# Patient Record
Sex: FEMALE | Race: ASIAN | Employment: FULL TIME | URBAN - METROPOLITAN AREA
[De-identification: names, ages, dates, MRNs, and addresses within clinical notes are randomized per-mention and may not be internally consistent; named-entity substitution may affect disease eponyms.]

---

## 2017-02-13 LAB
C TRACH DNA SPEC QL PROBE+SIG AMP: NEGATIVE
N GONORRHOEA DNA SPEC QL PROBE+SIG AMP: NEGATIVE

## 2019-02-19 LAB
HBV SURFACE AG SERPL QL IA: POSITIVE
HIV 1+2 AB+HIV1 P24 AG SERPL QL IA: NORMAL
RUBELLA ABY IGG: NORMAL
TREPONEMA ANTIBODIES: NORMAL

## 2019-08-29 LAB — GROUP B STREP PCR: NEGATIVE

## 2019-09-22 ENCOUNTER — HOSPITAL ENCOUNTER (OUTPATIENT)
Facility: CLINIC | Age: 29
End: 2019-09-22
Attending: OBSTETRICS & GYNECOLOGY | Admitting: OBSTETRICS & GYNECOLOGY

## 2019-09-22 ENCOUNTER — HOSPITAL ENCOUNTER (INPATIENT)
Facility: CLINIC | Age: 29
LOS: 2 days | Discharge: HOME HEALTH | End: 2019-09-24
Attending: OBSTETRICS & GYNECOLOGY | Admitting: OBSTETRICS & GYNECOLOGY
Payer: COMMERCIAL

## 2019-09-22 LAB
ABO + RH BLD: NORMAL
ABO + RH BLD: NORMAL
BLD GP AB SCN SERPL QL: NORMAL
BLOOD BANK CMNT PATIENT-IMP: NORMAL
HGB BLD-MCNC: 13.4 G/DL (ref 11.7–15.7)
RUPTURE OF FETAL MEMBRANES BY ROM PLUS: POSITIVE
SPECIMEN EXP DATE BLD: NORMAL
T PALLIDUM AB SER QL: NONREACTIVE

## 2019-09-22 PROCEDURE — 86780 TREPONEMA PALLIDUM: CPT | Performed by: OBSTETRICS & GYNECOLOGY

## 2019-09-22 PROCEDURE — 85018 HEMOGLOBIN: CPT | Performed by: OBSTETRICS & GYNECOLOGY

## 2019-09-22 PROCEDURE — 25000132 ZZH RX MED GY IP 250 OP 250 PS 637: Performed by: OBSTETRICS & GYNECOLOGY

## 2019-09-22 PROCEDURE — 25800030 ZZH RX IP 258 OP 636: Performed by: OBSTETRICS & GYNECOLOGY

## 2019-09-22 PROCEDURE — 25000128 H RX IP 250 OP 636: Performed by: OBSTETRICS & GYNECOLOGY

## 2019-09-22 PROCEDURE — 86900 BLOOD TYPING SEROLOGIC ABO: CPT | Performed by: OBSTETRICS & GYNECOLOGY

## 2019-09-22 PROCEDURE — 84112 EVAL AMNIOTIC FLUID PROTEIN: CPT | Performed by: OBSTETRICS & GYNECOLOGY

## 2019-09-22 PROCEDURE — G0463 HOSPITAL OUTPT CLINIC VISIT: HCPCS

## 2019-09-22 PROCEDURE — 12000000 ZZH R&B MED SURG/OB

## 2019-09-22 PROCEDURE — 86850 RBC ANTIBODY SCREEN: CPT | Performed by: OBSTETRICS & GYNECOLOGY

## 2019-09-22 PROCEDURE — 37000011 ZZH ANESTHESIA WARD SERVICE

## 2019-09-22 PROCEDURE — 86901 BLOOD TYPING SEROLOGIC RH(D): CPT | Performed by: OBSTETRICS & GYNECOLOGY

## 2019-09-22 PROCEDURE — 25000125 ZZHC RX 250: Performed by: OBSTETRICS & GYNECOLOGY

## 2019-09-22 PROCEDURE — 72200001 ZZH LABOR CARE VAGINAL DELIVERY SINGLE

## 2019-09-22 PROCEDURE — 0KQM0ZZ REPAIR PERINEUM MUSCLE, OPEN APPROACH: ICD-10-PCS | Performed by: OBSTETRICS & GYNECOLOGY

## 2019-09-22 RX ORDER — AMOXICILLIN 250 MG
2 CAPSULE ORAL 2 TIMES DAILY
Status: DISCONTINUED | OUTPATIENT
Start: 2019-09-22 | End: 2019-09-24 | Stop reason: HOSPADM

## 2019-09-22 RX ORDER — ACETAMINOPHEN 325 MG/1
650 TABLET ORAL EVERY 4 HOURS PRN
Status: DISCONTINUED | OUTPATIENT
Start: 2019-09-22 | End: 2019-09-24 | Stop reason: HOSPADM

## 2019-09-22 RX ORDER — OXYTOCIN/0.9 % SODIUM CHLORIDE 30/500 ML
100 PLASTIC BAG, INJECTION (ML) INTRAVENOUS CONTINUOUS
Status: DISCONTINUED | OUTPATIENT
Start: 2019-09-22 | End: 2019-09-24 | Stop reason: HOSPADM

## 2019-09-22 RX ORDER — OXYTOCIN 10 [USP'U]/ML
10 INJECTION, SOLUTION INTRAMUSCULAR; INTRAVENOUS
Status: DISCONTINUED | OUTPATIENT
Start: 2019-09-22 | End: 2019-09-23 | Stop reason: CLARIF

## 2019-09-22 RX ORDER — OXYCODONE AND ACETAMINOPHEN 5; 325 MG/1; MG/1
1 TABLET ORAL
Status: DISCONTINUED | OUTPATIENT
Start: 2019-09-22 | End: 2019-09-23 | Stop reason: CLARIF

## 2019-09-22 RX ORDER — PRENATAL VIT/IRON FUM/FOLIC AC 27MG-0.8MG
1 TABLET ORAL DAILY
COMMUNITY

## 2019-09-22 RX ORDER — IBUPROFEN 800 MG/1
800 TABLET, FILM COATED ORAL EVERY 6 HOURS PRN
Status: DISCONTINUED | OUTPATIENT
Start: 2019-09-22 | End: 2019-09-24 | Stop reason: HOSPADM

## 2019-09-22 RX ORDER — IBUPROFEN 800 MG/1
800 TABLET, FILM COATED ORAL
Status: COMPLETED | OUTPATIENT
Start: 2019-09-22 | End: 2019-09-22

## 2019-09-22 RX ORDER — BUPIVACAINE HCL/0.9 % NACL/PF 0.125 %
PLASTIC BAG, INJECTION (ML) EPIDURAL CONTINUOUS
Status: DISCONTINUED | OUTPATIENT
Start: 2019-09-22 | End: 2019-09-23 | Stop reason: CLARIF

## 2019-09-22 RX ORDER — BISACODYL 10 MG
10 SUPPOSITORY, RECTAL RECTAL DAILY PRN
Status: DISCONTINUED | OUTPATIENT
Start: 2019-09-24 | End: 2019-09-24 | Stop reason: HOSPADM

## 2019-09-22 RX ORDER — FENTANYL CITRATE 50 UG/ML
50-100 INJECTION, SOLUTION INTRAMUSCULAR; INTRAVENOUS
Status: DISCONTINUED | OUTPATIENT
Start: 2019-09-22 | End: 2019-09-23 | Stop reason: CLARIF

## 2019-09-22 RX ORDER — SODIUM CHLORIDE, SODIUM LACTATE, POTASSIUM CHLORIDE, CALCIUM CHLORIDE 600; 310; 30; 20 MG/100ML; MG/100ML; MG/100ML; MG/100ML
INJECTION, SOLUTION INTRAVENOUS CONTINUOUS
Status: DISCONTINUED | OUTPATIENT
Start: 2019-09-22 | End: 2019-09-23 | Stop reason: CLARIF

## 2019-09-22 RX ORDER — METHYLERGONOVINE MALEATE 0.2 MG/ML
200 INJECTION INTRAVENOUS
Status: DISCONTINUED | OUTPATIENT
Start: 2019-09-22 | End: 2019-09-23 | Stop reason: CLARIF

## 2019-09-22 RX ORDER — CARBOPROST TROMETHAMINE 250 UG/ML
250 INJECTION, SOLUTION INTRAMUSCULAR
Status: DISCONTINUED | OUTPATIENT
Start: 2019-09-22 | End: 2019-09-23 | Stop reason: CLARIF

## 2019-09-22 RX ORDER — OXYTOCIN/0.9 % SODIUM CHLORIDE 30/500 ML
340 PLASTIC BAG, INJECTION (ML) INTRAVENOUS CONTINUOUS PRN
Status: DISCONTINUED | OUTPATIENT
Start: 2019-09-22 | End: 2019-09-24 | Stop reason: HOSPADM

## 2019-09-22 RX ORDER — NALBUPHINE HYDROCHLORIDE 10 MG/ML
2.5-5 INJECTION, SOLUTION INTRAMUSCULAR; INTRAVENOUS; SUBCUTANEOUS EVERY 6 HOURS PRN
Status: DISCONTINUED | OUTPATIENT
Start: 2019-09-22 | End: 2019-09-23 | Stop reason: CLARIF

## 2019-09-22 RX ORDER — LANOLIN 100 %
OINTMENT (GRAM) TOPICAL
Status: DISCONTINUED | OUTPATIENT
Start: 2019-09-22 | End: 2019-09-24 | Stop reason: HOSPADM

## 2019-09-22 RX ORDER — OXYTOCIN/0.9 % SODIUM CHLORIDE 30/500 ML
100-340 PLASTIC BAG, INJECTION (ML) INTRAVENOUS CONTINUOUS PRN
Status: COMPLETED | OUTPATIENT
Start: 2019-09-22 | End: 2019-09-22

## 2019-09-22 RX ORDER — HYDROCORTISONE 2.5 %
CREAM (GRAM) TOPICAL 3 TIMES DAILY PRN
Status: DISCONTINUED | OUTPATIENT
Start: 2019-09-22 | End: 2019-09-24 | Stop reason: HOSPADM

## 2019-09-22 RX ORDER — OXYTOCIN 10 [USP'U]/ML
10 INJECTION, SOLUTION INTRAMUSCULAR; INTRAVENOUS
Status: DISCONTINUED | OUTPATIENT
Start: 2019-09-22 | End: 2019-09-24 | Stop reason: HOSPADM

## 2019-09-22 RX ORDER — NALOXONE HYDROCHLORIDE 0.4 MG/ML
.1-.4 INJECTION, SOLUTION INTRAMUSCULAR; INTRAVENOUS; SUBCUTANEOUS
Status: DISCONTINUED | OUTPATIENT
Start: 2019-09-22 | End: 2019-09-22

## 2019-09-22 RX ORDER — ONDANSETRON 2 MG/ML
4 INJECTION INTRAMUSCULAR; INTRAVENOUS EVERY 6 HOURS PRN
Status: DISCONTINUED | OUTPATIENT
Start: 2019-09-22 | End: 2019-09-23 | Stop reason: CLARIF

## 2019-09-22 RX ORDER — ACETAMINOPHEN 325 MG/1
650 TABLET ORAL EVERY 4 HOURS PRN
Status: DISCONTINUED | OUTPATIENT
Start: 2019-09-22 | End: 2019-09-23 | Stop reason: CLARIF

## 2019-09-22 RX ORDER — AMOXICILLIN 250 MG
1 CAPSULE ORAL 2 TIMES DAILY
Status: DISCONTINUED | OUTPATIENT
Start: 2019-09-22 | End: 2019-09-24 | Stop reason: HOSPADM

## 2019-09-22 RX ORDER — NALOXONE HYDROCHLORIDE 0.4 MG/ML
.1-.4 INJECTION, SOLUTION INTRAMUSCULAR; INTRAVENOUS; SUBCUTANEOUS
Status: DISCONTINUED | OUTPATIENT
Start: 2019-09-22 | End: 2019-09-24 | Stop reason: HOSPADM

## 2019-09-22 RX ORDER — EPHEDRINE SULFATE 50 MG/ML
5 INJECTION, SOLUTION INTRAMUSCULAR; INTRAVENOUS; SUBCUTANEOUS
Status: DISCONTINUED | OUTPATIENT
Start: 2019-09-22 | End: 2019-09-23 | Stop reason: CLARIF

## 2019-09-22 RX ADMIN — Medication 340 ML/HR: at 12:28

## 2019-09-22 RX ADMIN — SENNOSIDES AND DOCUSATE SODIUM 1 TABLET: 8.6; 5 TABLET ORAL at 21:04

## 2019-09-22 RX ADMIN — IBUPROFEN 800 MG: 800 TABLET ORAL at 19:19

## 2019-09-22 RX ADMIN — SODIUM CHLORIDE, POTASSIUM CHLORIDE, SODIUM LACTATE AND CALCIUM CHLORIDE: 600; 310; 30; 20 INJECTION, SOLUTION INTRAVENOUS at 06:53

## 2019-09-22 RX ADMIN — FENTANYL CITRATE 100 MCG: 50 INJECTION INTRAMUSCULAR; INTRAVENOUS at 07:05

## 2019-09-22 RX ADMIN — IBUPROFEN 800 MG: 800 TABLET ORAL at 12:55

## 2019-09-22 RX ADMIN — FENTANYL CITRATE 100 MCG: 50 INJECTION INTRAMUSCULAR; INTRAVENOUS at 09:31

## 2019-09-22 RX ADMIN — FENTANYL CITRATE 100 MCG: 50 INJECTION INTRAMUSCULAR; INTRAVENOUS at 05:55

## 2019-09-22 RX ADMIN — FENTANYL CITRATE 100 MCG: 50 INJECTION INTRAMUSCULAR; INTRAVENOUS at 08:15

## 2019-09-22 RX ADMIN — LIDOCAINE HYDROCHLORIDE 30 ML: 10 INJECTION, SOLUTION EPIDURAL; INFILTRATION; INTRACAUDAL; PERINEURAL at 12:40

## 2019-09-22 SDOH — HEALTH STABILITY: MENTAL HEALTH: HOW OFTEN DO YOU HAVE A DRINK CONTAINING ALCOHOL?: NEVER

## 2019-09-22 NOTE — PLAN OF CARE
Data: Patient presented to Birthplace: 2019  5:01 AM.  Reason for maternal/fetal assessment is uterine contractions and leaking vaginal fluid. Patient reports leaking of fluid at 0200 also the start of contractions.  Patient is a .  Prenatal record reviewed. Pregnancy  has been complicated by has been uncomplicated.  Gestational Age 39w3d. VSS. Fetal movement present. Patient denies nausea, vomiting, headache, visual disturbances, epigastric or URQ pain, significant edema. Support person is present.   Action: Verbal consent for EFM. Triage assessment completed. Bill of rights reviewed.  Response: Patient verbalized agreement with plan. Will contact Dr Denise Harris with update and further orders.

## 2019-09-22 NOTE — DOWNTIME EVENT NOTE
The EMR was down for 2.5 hours on 9/22/2019.    MARVIN Broussard was responsible for completing the paper charting during this time period.     The following information was re-entered into the system by Aarti Hill RN: Orders    The following information will remain in the paper chart: rom + results    Aarti Hill RN  9/22/2019

## 2019-09-22 NOTE — PROVIDER NOTIFICATION
09/22/19 1012   Provider Notification   Provider Name/Title Dr. Dumont   Method of Notification Phone   Request Evaluate - Remote   Notification Reason SVE;Status Update     RN performed SVE. Pt had anterior lip that was reduced with pushing. Dr. Dumont notified and states to push with pt to ensure AL is gone and she will be up to the floor ASAP to evaluate pt in person.

## 2019-09-22 NOTE — PROVIDER NOTIFICATION
09/22/19 0550   Provider Notification   Provider Name/Title Dr. Harris   Method of Notification Phone   Request Evaluate - Remote   Notification Reason Pain;SVE     OB notified patient is now 6.5/90/-1 and very uncomfortable. OB gives verbal order for one dose of fentanyl since patient is a G1

## 2019-09-22 NOTE — PLAN OF CARE
Data: Leonela Darling transferred to 425 via wheelchair at 1620. Baby transferred via parent's arms.  Action: Receiving unit notified of transfer: Yes. Patient and family notified of room change. Report given to Chalino at 1635. Belongings sent to receiving unit. Accompanied by Registered Nurse. Oriented patient to surroundings. Call light within reach. ID bands double-checked with receiving RN.  Response: Patient tolerated transfer and is stable.Pt up to void before transfer

## 2019-09-22 NOTE — PLAN OF CARE
"\"Patient in room 9, Leonela Darling, is now 7/90/0 and requesting another dose of fentanyl. She is a G1 at 39+3 weeks. Yaz every 2-4 minutes, category 1 tracing. Doesn't want an epidural at this time. -MARVIN Broussard [id number]\"    spok page sent to Dr. Dumont    Verbal order to give fentanyl by Dr. Dumont  "

## 2019-09-22 NOTE — PROVIDER NOTIFICATION
09/22/19 0920   Provider Notification   Provider Name/Title Dr. Dumont   Method of Notification Phone   Request Evaluate - Remote   Notification Reason Pain;SVE     RN performed SVE - Dr. Dumont notified regarding status update. Will continue to follow.

## 2019-09-22 NOTE — L&D DELIVERY NOTE
Leonela Darling is a 29 year old-year-old female,  1 para 0 with and EDC 19, who was admitted for SROM at 39 weeks gestation.    Her prenatal care was at the [facility] in [place]. Prenatal course was complicated by chronic hep B. Vaginal Group B Streptococcus culture was negative.  Her estimated fetal weight was 7 pounds 7ounces.  The patient achieved complete dilation at 1012. She went on to deliver a male infant at 1220 by . Apgars were  7 at one minute and 9 at five minutes. The fetal oropharynx was bulb suctioned on the perineum.  There was no nuchal cord. The placenta delivered spontaneously and intact at 1225.  The patient had a second degree that extended into a left labia. This was repaired with #3-0 vicryl.  EBL for the delivery was 153.  Dr. Jacqueline Dumont DO

## 2019-09-22 NOTE — H&P
[facility]     History and Physical  Obstetrics and Gynecology     Date of Admission:  2019    History of Present Illness   Leonela Darling is a 29 year old female  39w3d with Estimated Date of Delivery: Sep 26, 2019 who presents with ROM    She presents to the [facility] in ROM.    PRENATAL COURSE  Prenatal care was received at [facility] and the  course was complicated by chronic hep B.      Recent Labs   Lab Test 19  0515   ABO AB   RH Pos   AS Neg     Rhogam not indicated   Recent Labs   Lab Test 19   HEPBANG  --  Positive   HIAGAB  --  Non-reactive   GBS Negative  --        Past Medical History    Past medical history reviewed     Past Surgical History   Past surgical history reviewed      Prior to Admission Medications   Prior to Admission Medications   Prescriptions Last Dose Informant Patient Reported? Taking?   Prenatal Vit-Fe Fumarate-FA (PRENATAL MULTIVITAMIN W/IRON) 27-0.8 MG tablet 2019 at Unknown time  Yes Yes   Sig: Take 1 tablet by mouth daily      Facility-Administered Medications: None     Allergies   No Known Allergies    Social History   I have personally reviewed the social history     Family History   Family history reviewed with patient and is noncontributory.    Immunization History   Immunizations are up to date    Physical Exam   Temp: 98.1  F (36.7  C) Temp src: Oral BP: 131/75 Pulse: 77   Resp: 18        Vital Signs with Ranges  Temp:  [98.1  F (36.7  C)-98.8  F (37.1  C)] 98.1  F (36.7  C)  Pulse:  [77] 77  Resp:  [16-18] 18  BP: (131-143)/(75-86) 131/75  Fetal Heart Tones: 135 baseline, moderate variablility, + accels, no decels and Category I  TOCO: frequency q 2-6 minutes    Constitutional: healthy, alert and active   Respiratory: No increased work of breathing, good air exchange, clear to auscultation bilaterally, no crackles or wheezing  Cardiovascular: Normal apical impulse, regular rate and  rhythm, normal S1 and S2, no S3 or S4, and no murmur noted  Abdomen: gravid, single vertex fetus, non-tender, EFW 7 lbs 7  Cervical Exam:        Assessment & Plan   Leonela Darling is a 29 year old female  who presents at 39w3d with ROM and labor.   GBS begtive.   NST reactive.  Category  I.     Admit - see IP orders  Anticipate     Jacqueline Dumont DO,

## 2019-09-22 NOTE — PROVIDER NOTIFICATION
09/22/19 0400   Provider Notification   Provider Name/Title Dr. Harris   Method of Notification Phone   Request Evaluate - Remote   Notification Reason Patient Arrived;Membrane Status;SVE     OB notified of patient arrival. Patient is a G1 at 39 weeks, no prenatal information due to epic downtime, patient is a [employer] divert. SVE 3/90/-1, contractions q 1.5-3 minutes palpating moderate. ROM + positive. Patient to be admitted for labor.

## 2019-09-23 PROCEDURE — 12000000 ZZH R&B MED SURG/OB

## 2019-09-23 PROCEDURE — 25000132 ZZH RX MED GY IP 250 OP 250 PS 637: Performed by: OBSTETRICS & GYNECOLOGY

## 2019-09-23 RX ADMIN — SENNOSIDES AND DOCUSATE SODIUM 1 TABLET: 8.6; 5 TABLET ORAL at 21:18

## 2019-09-23 RX ADMIN — IBUPROFEN 800 MG: 800 TABLET ORAL at 05:12

## 2019-09-23 RX ADMIN — ACETAMINOPHEN 650 MG: 325 TABLET, FILM COATED ORAL at 10:00

## 2019-09-23 RX ADMIN — IBUPROFEN 800 MG: 800 TABLET ORAL at 21:18

## 2019-09-23 RX ADMIN — IBUPROFEN 800 MG: 800 TABLET ORAL at 15:38

## 2019-09-23 RX ADMIN — SENNOSIDES AND DOCUSATE SODIUM 1 TABLET: 8.6; 5 TABLET ORAL at 10:00

## 2019-09-23 NOTE — PLAN OF CARE
Vital signs stable on room air. Ambulating independently. Voiding spontaneously. Pain adequately controled with ibuprofen.  at bedside and supportive. Attempting to breastfeed, using a nipple shield as needed, baby is sleepy at the breast, hand expressing colostrum.

## 2019-09-23 NOTE — PLAN OF CARE
Patient meeting expected outcomes, VSS. Bonding well with infant (Eder). Supported in room by  (Terrell).  Fundus firm, midline ; Lochia rubra, scant.   Feeding: Breastfeeding infant with some assistance needed with positioning. Infant sleepy at breast, so parents have wanted to supplement with formula after breastfeeding attempts which is tolerated well. Education done with positioning and waking the infant a bit for feeds.  Mobility: Up ad franko, independent with cares for self and infant.  I&O: Regular diet, voiding.   Pain: denies.   IV: removed during shift.   Note: Hepatitis B positive.

## 2019-09-23 NOTE — PROGRESS NOTES
Patient comfotable. Tolerating regular diet. Urinating ok. Bleeding moderate/minimal. Pain under control with ibuprofen and tylenol.        /73   Pulse 78   Temp 99  F (37.2  C) (Oral)   Resp 20   Breastfeeding? Unknown   Gen: well female NAD  FF: umb   Ext: NT no cords    A/P  Post partum day #1  Doing well  Routine post partum cares  Anticipate discharge tomorrow    Nicole Cobb MD on 9/23/2019 at 1:06 PM

## 2019-09-23 NOTE — PROGRESS NOTES
Patient unavailable as requested no interruptions when Public Health Nurse (PHN) attempted to discuss [facility] resources.

## 2019-09-23 NOTE — PLAN OF CARE
Instructed to fill out birth certificate , depression scale and watch DVD.   Pt taking Ibuprofen for uterine cramping with relief.   Mom ad FOB are independent with baby cares.   Lots of visitors this evening.

## 2019-09-23 NOTE — LACTATION NOTE
This note was copied from a baby's chart.  LC visit and assist with latch.  Her baby has been supplementing with formula per parental preference due to concerns about milk supply.  LC noted good volumes of colostrum that are easily expressed, but disinterest in latching by infant due to transitioning behavior with spittiness.  LC does not recommend formula at this time due to infant emeses and frequent burping.  LC assisted with latch on both sides.  Nipple shield was required due to short nipples and an uncoordinated suck pattern.  He did move into a better suck pattern on the left once stimulated.  LC will continue to follow and provide support.  Infant placed STS following an attempt on the second side.

## 2019-09-23 NOTE — PLAN OF CARE
Pt is breast feeding and supplements her infant with formula, seen by lactation RN, encouraged to breast feed first and call for help; vss, pain is controlled with pharmacological interventions, care of plan is reviewed with pt and family.

## 2019-09-24 VITALS
TEMPERATURE: 99.1 F | HEART RATE: 80 BPM | SYSTOLIC BLOOD PRESSURE: 112 MMHG | RESPIRATION RATE: 18 BRPM | DIASTOLIC BLOOD PRESSURE: 68 MMHG

## 2019-09-24 PROCEDURE — 25000132 ZZH RX MED GY IP 250 OP 250 PS 637: Performed by: OBSTETRICS & GYNECOLOGY

## 2019-09-24 RX ORDER — AMOXICILLIN 250 MG
1 CAPSULE ORAL 2 TIMES DAILY PRN
Qty: 12 TABLET | Refills: 1 | Status: SHIPPED | OUTPATIENT
Start: 2019-09-24

## 2019-09-24 RX ORDER — IBUPROFEN 800 MG/1
800 TABLET, FILM COATED ORAL EVERY 6 HOURS PRN
Qty: 40 TABLET | Refills: 1 | Status: SHIPPED | OUTPATIENT
Start: 2019-09-24

## 2019-09-24 RX ADMIN — SENNOSIDES AND DOCUSATE SODIUM 1 TABLET: 8.6; 5 TABLET ORAL at 09:44

## 2019-09-24 NOTE — LACTATION NOTE
This note was copied from a baby's chart.  LC visit. Her baby has been nursing better today per her report.  She formula fed her baby often on the evening and until early morning, but thinks the last nursing session was much better.  She plans to pump and bottle at home. LC discussed pumping and bottling and encouraged her to continue to place infant to breast to help establish her milk supply.  EDE also offered support with breastfeeding if desired and will call following discharge due to nipple shield use and some struggles with latching.  She feels more confident today.

## 2019-09-24 NOTE — DISCHARGE INSTRUCTIONS
Postpartum Vaginal Delivery Instructions  [facility] lactation: [phone]  [facility]: [phone]    Activity       Ask family and friends for help when you need it.    Do not place anything in your vagina for 6 weeks.    You are not restricted on other activities, but take it easy for a few weeks to allow your body to recover from delivery.  You are able to do any activities you feel up to that point.    No driving until you have stopped taking your pain medications (usually two weeks after delivery).     Call your health care provider if you have any of these symptoms:       Increased pain, swelling, redness, or fluid around your stiches from an episiotomy or perineal tear.    A fever above 100.4 F (38 C) with or without chills when placing a thermometer under your tongue.    You soak a sanitary pad with blood within 1 hour, or you see blood clots larger than a golf ball.    Bleeding that lasts more than 6 weeks.    Vaginal discharge that smells bad.    Severe pain, cramping or tenderness in your lower belly area.    A need to urinate more frequently (use the toilet more often), more urgently (use the toilet very quickly), or it burns when you urinate.    Nausea and vomiting.    Redness, swelling or pain around a vein in your leg.    Problems breastfeeding or a red or painful area on your breast.    Chest pain and cough or are gasping for air.    Problems coping with sadness, anxiety, or depression.  If you have any concerns about hurting yourself or the baby, call your provider immediately.     You have questions or concerns after you return home.     Keep your hands clean:  Always wash your hands before touching your perineal area and stitches.  This helps reduce your risk of infection.  If your hands aren't dirty, you may use an alcohol hand-rub to clean your hands. Keep your nails clean and short.

## 2019-09-24 NOTE — DISCHARGE SUMMARY
[facility] Discharge Summary    Leonela Darling MRN# [id number]   Age: 29 year old YOB: 1990     Date of Admission:  2019  Date of Discharge::  2019   Admitting Physician:  Jacqueline Dumont DO  Discharge Physician:  Margo Polo MD           Admission Diagnoses:     IUP at 39w3d    SROM    Latent labor    Hepatitis B surface antigen positive            Discharge Diagnosis:       IUP at 39w3d, now delivered          Procedures:     Procedure(s): -           Medications Prior to Admission:     Medications Prior to Admission   Medication Sig Dispense Refill Last Dose     Prenatal Vit-Fe Fumarate-FA (PRENATAL MULTIVITAMIN W/IRON) 27-0.8 MG tablet Take 1 tablet by mouth daily   2019 at Unknown time             Discharge Medications:        Review of your medicines      START taking      Dose / Directions   ibuprofen 800 MG tablet  Commonly known as:  ADVIL/MOTRIN      Dose:  800 mg  Take 1 tablet (800 mg) by mouth every 6 hours as needed for other (cramping)  Quantity:  40 tablet  Refills:  1     senna-docusate 8.6-50 MG tablet  Commonly known as:  SENOKOT-S/PERICOLACE      Dose:  1 tablet  Take 1 tablet by mouth 2 times daily as needed for constipation  Quantity:  12 tablet  Refills:  1        CONTINUE these medicines which have NOT CHANGED      Dose / Directions   prenatal multivitamin w/iron 27-0.8 MG tablet      Dose:  1 tablet  Take 1 tablet by mouth daily  Refills:  0           Where to get your medicines      These medications were sent to [facility] [address] [address]    Phone:  [phone]     ibuprofen 800 MG tablet    senna-docusate 8.6-50 MG tablet                Consultations:   None          Brief Admission History and Intrapartum Course:   Leonela Darling is a 29 year old-year-old female,  1 para 0 with and EDC 19, who was admitted for SROM at 39 weeks gestation.    Her  prenatal care was at the [facility] in [place]. Prenatal course was complicated by chronic hep B. Vaginal Group B Streptococcus culture was negative.  Her estimated fetal weight was 7 pounds 7ounces.  The patient achieved complete dilation at 1012. She went on to deliver a male infant at 1220 by . Apgars were  7 at one minute and 9 at five minutes. The fetal oropharynx was bulb suctioned on the perineum.  There was no nuchal cord. The placenta delivered spontaneously and intact at 1225.  The patient had a second degree that extended into a left labia. This was repaired with #3-0 vicryl.  EBL for the delivery was 153.    Delivery Summary    Leonela Darling MRN# [id number]   Age: 29 year old YOB: 1990          Labor Event Times    Labor onset date:  19 Onset time:   2:00 AM   Dilation complete date:  19 Complete time:  10:12 AM   Start pushing date/time:  2019 1037      Labor Length    1st Stage (hrs):  8 (min):  12   2nd Stage (hrs):  2 (min):  8   3rd Stage (hrs):  0 (min):  4      Labor Events     labor?:  No  Labor Type:  Spontaneous  Predominate monitoring during 1st stage:  continuous electronic fetal monitoring     Rupture date/time: 19 0200   Rupture type:  Spontaneous rupture of membranes occuring during spontaneous labor or augmentation  Fluid color:  Clear  Fluid odor:  Normal     Delivery/Placenta Date and Time    Delivery Date:  19 Delivery Time:  12:20 PM   Placenta Date/Time:  2019 12:25 PM     Vaginal Counts     Initial count performed by 2 team members:   Two Team Members   Nelda Thibodeaux RN       [employer]   Initial counts 2  5    Added to count  1     Final counts 2 1 5    Placed during labor Accounted for at the end of labor   NA    NA    NA     Final count performed by 2 team members:   Two Team Members   Nelda Thibodeaux RN      Final count correct?:  Yes         Apgars    Living status:  Living    "1 Minute 5 Minute 10 Minute 15 Minute 20 Minute   Skin color: 0  1       Heart rate: 2  2       Reflex irritability: 2  2       Muscle tone: 2  2       Respiratory effort: 1  2       Total: 7  9       Apgars assigned by:  YARELI WONG     Cord    Vessels:  3 Vessels Complications:  None   Cord Blood Disposition:  Lab Gases Sent?:  No      [place] Measurements    Weight:  6 lb 3.1 oz Length:  1' 7.5\"   Head circumference:  30.5 cm       Skin to Skin and Feeding Plan    Skin to skin initiation date/time: 1841    Skin to skin with:  Mother  Skin to skin end date/time:        Labor Events and Shoulder Dystocia    Fetal Tracing Prior to Delivery:  Category 1  Shoulder dystocia present?:  Neg             Delivery (Maternal) (Provider to Complete) ([id number])    Episiotomy:  None  Perineal lacerations:  2nd Repaired?:  Yes   Labial laceration:  left Repaired?:  Yes      Blood Loss  Mother: Leonela Darling #[id number]   Start of Mother's Information    IO Blood Loss  19 0200 - 19 1220    Delivery QBL (mL) Hospital Encounter 153 mL    Total  153 mL         End of Mother's Information  Mother: Leonela Darling #[id number]         Delivery - Provider to Complete (002676)    Delivering clinician:  Jacqueline Dumont,   Attempted Delivery Types (Choose all that apply):  Spontaneous Vaginal Delivery  Delivery Type (Choose the 1 that will go to the Birth History):  Vaginal, Spontaneous   Other personnel:   Provider Role   Yareli Roldan RN Matzdorff, Daun, RN          Placenta    Delayed Cord Clamping:  Done  Date/Time:  2019 12:25 PM  Removal:  Spontaneous  Disposition:  Hospital disposal     Anesthesia    Method:  INTRAVENOUS REGIONAL          Presentation and Position    Presentation:  Vertex  Position:  Right Occiput Anterior                Post-partum Course:   The patient's hospital course was unremarkable.  On discharge, her pain was well controlled. Vaginal bleeding is similar to peak menstrual flow.  " Voiding without difficulty.  Ambulating well and tolerating a normal diet.  No fever.  Breastfeeding well with bottle supplementation.  Infant is stable.  She was discharged on post-partum day #2.          Discharge Instructions and Follow-Up:     Discharge diet: Regular   Discharge activity: Pelvic rest for 6 weeks including no sexual intercourse, tampons, or douching.    Discharge follow-up: Follow up with your primary OB for a routine postpartum visit in 6 weeks           Discharge Disposition:     Discharged to home      Margo Nelson MD   Pager: [phone]   September 24, 2019, 1:02 PM

## 2019-09-24 NOTE — PLAN OF CARE
Patient meeting expected outcomes, VSS. Bonding well with infant (Eder). Supported in room by  (Terrell).  Fundus firm, midline; Lochia rubra, scant.   Feeding: Breastfeeding/ formula feeding infant. Lactation following. Instructed patient to bring infant to breast first and call for help with latch. Utilizing nipple shield. Infant more active and eager to feed. Plans to pump at home.  Mobility: Up ad franko, independent with cares for self and infant. Encouraging patient to ambulate in halls when she can.  I&O: Regular diet, voiding, passing flatus, stated she had a small BM on day shift.  Pain: Some uterine cramping intermittently, calls for ibuprofen/tylenol.  Note: Hepatitis B positive.

## 2019-09-24 NOTE — PROGRESS NOTES
[facility]   Post-partum Note    Name:  Leonela Darling  MRN: [id number]    S: Patient is dong well this AM.  Pain is controlled.  Tolerating regular diet without nausea or vomiting. Voiding spontaneously, passing flatus and has had two BMs since delivery.   Ambulating without dizziness.  Lochia less than menses.  Breast and bottle feeding. Declined contraception. Plans discharge today.    O:   Patient Vitals for the past 24 hrs:   BP Temp Temp src Pulse Resp   19 0947 112/68 99.1  F (37.3  C) Oral 80 18   19 0030 101/67 98.3  F (36.8  C) Oral 64 16   19 1543 105/71 98  F (36.7  C) Oral 73 18     Gen:  Resting comfortably, NAD  CV:  Regular rate  Pulm:  Non-labored breathing.  No cough or wheezing.   Abd:  Soft, appropriately tender to palpation, non-distended.  Fundus at  umbilicus, firm and non-tender.  Ext:  Non-tender, 1+ LE edema b/l      Assessment/Plan:  29 year old  on PPD #2 s/p .  Continue with routine postpartum management.     Pregnancy c/b:   - Hepatitis B surface antigen positive    Pain: Well-controlled with ibuprofen and tylenol  Hgb: 13.4>QBL 153cc. VSS as noted above, asymptomatic.   GI:  BID Senna/Colace.  PRN Simethicone.   PPx:  Encouraged ambulation   Rh: Positive   Rubella: Immune  Feed: Breast  BC: Declined  Dispo: Plan for home today    Margo Nelson MD   Pager: [phone]   2019

## 2019-09-24 NOTE — PLAN OF CARE
VSS, denies pharmacological interventions at shift, ambulating well in room and mostly feeding her infant with formula, planning on pumping at home, seen again by lactation RN; discharge education is complete, knows to follow up with OB Dr in 6 weeks; discharging to home with baby in stable condition.

## 2019-10-01 ENCOUNTER — TELEPHONE (OUTPATIENT)
Dept: OBGYN | Facility: CLINIC | Age: 29
End: 2019-10-01

## 2020-11-29 ENCOUNTER — HEALTH MAINTENANCE LETTER (OUTPATIENT)
Age: 30
End: 2020-11-29

## 2021-09-19 ENCOUNTER — HEALTH MAINTENANCE LETTER (OUTPATIENT)
Age: 31
End: 2021-09-19

## 2022-01-09 ENCOUNTER — HEALTH MAINTENANCE LETTER (OUTPATIENT)
Age: 32
End: 2022-01-09

## 2022-11-21 ENCOUNTER — HEALTH MAINTENANCE LETTER (OUTPATIENT)
Age: 32
End: 2022-11-21

## 2023-04-16 ENCOUNTER — HEALTH MAINTENANCE LETTER (OUTPATIENT)
Age: 33
End: 2023-04-16